# Patient Record
(demographics unavailable — no encounter records)

---

## 2025-04-10 NOTE — PLAN
[FreeTextEntry1] : Patient recovering well post op will return to work limited duty until the end of the month then resume regular work duties 5/1/25 Reports history of regular but heavy/painful menses. Tried patches and OCPs in past and had bad GI side effects. Discussed alternative options. Patient considering DMPA injection. Will call office if she chooses to proceed return 1yr annual exam or PRN

## 2025-04-10 NOTE — HISTORY OF PRESENT ILLNESS
[de-identified] : 10 days [de-identified] : diagnostic laparoscopy. No evidence of torsion.  [de-identified] : possible torsion [de-identified] : Pain is very minimal, some discomfort by umbilical incision. Taking no PO pain meds [de-identified] : laparoscopic incision c/d/i x2, no erythema, induration, dehiscence or drainage. No evidence of infection